# Patient Record
(demographics unavailable — no encounter records)

---

## 2024-11-05 NOTE — ASSESSMENT
[FreeTextEntry1] : Elevated PSA.  Improved PSA and PSA density is now within the normal range.  Will continue to monitor PSA and recheck in 6 months.  BPH.  This is a chronic condition requiring longitudinal follow-up.  Will continue to monitor for symptoms.